# Patient Record
(demographics unavailable — no encounter records)

---

## 2024-10-30 NOTE — PHYSICAL EXAM
[] : groin pain with internal rotation [Right] : right hip with pelvis [Mild arthritis ([employer] 1)] : Mild arthritis ([employer] 1)

## 2024-10-30 NOTE — ASSESSMENT
[FreeTextEntry1] : 81F p/w R hip OA, abductor tendonits  f/u R hip MRI r/o AVN continue HEP return after imaging  The patient was advised of the diagnosis. The natural history of the pathology was explained in full to the patient in layman's terms. All questions were answered. The risks and benefits of surgical and non-surgical treatment alternatives were explained in full to the patient.

## 2024-10-30 NOTE — HISTORY OF PRESENT ILLNESS
[Household chores] : household chores [Social interactions] : social interactions [9] : 9 [6] : 6 [Dull/Aching] : dull/aching [Sharp] : sharp [Constant] : constant [Retired] : Work status: retired [de-identified] : 10/30/24: pt is here today for rt hip pain, pain started on and off for a few years bu it gotten worse over time. pt states she was seen at the hospital and was told the pain could be a gastro and when she followed up with her GI, he told her the pain is coming from the hip and was told to go to ortho specialist. pt also states she does get numbness and tingling within her lt leg. [FreeTextEntry1] : RT HIP  [de-identified] : getting out of car.

## 2024-11-06 NOTE — HISTORY OF PRESENT ILLNESS
[9] : 9 [6] : 6 [Dull/Aching] : dull/aching [Sharp] : sharp [Constant] : constant [Household chores] : household chores [Social interactions] : social interactions [Retired] : Work status: retired [de-identified] : 10/30/24: pt is here today for rt hip pain, pain started on and off for a few years bu it gotten worse over time. pt states she was seen at the hospital and was told the pain could be a gastro and when she followed up with her GI, he told her the pain is coming from the hip and was told to go to ortho specialist. pt also states she does get numbness and tingling within her lt leg.  11/06/24: pt is here today for MRI results of  R hip, pt states she still has pain within the hip. [FreeTextEntry1] : RT HIP  [de-identified] : getting out of car.

## 2024-11-06 NOTE — ASSESSMENT
[FreeTextEntry1] : 81F p/w R hip OA, abductor tendonits  MRI R hip with OA and labrum tear continue HEP f/u pain mgmt re possible IA CSI  The patient was advised of the diagnosis. The natural history of the pathology was explained in full to the patient in layman's terms. All questions were answered. The risks and benefits of surgical and non-surgical treatment alternatives were explained in full to the patient.